# Patient Record
(demographics unavailable — no encounter records)

---

## 2025-05-06 NOTE — HISTORY OF PRESENT ILLNESS
[FreeTextEntry1] : Here for CPE. Overall feeling well [de-identified] : Never a smoker. Rarely consumes alcohol. Doesn't exercise regularly

## 2025-05-06 NOTE — ASSESSMENT
[Vaccines Reviewed] : Immunizations reviewed today. Please see immunization details in the vaccine log within the immunization flowsheet.  [FreeTextEntry1] : EKG SR, RSR' Feeling well